# Patient Record
Sex: MALE | URBAN - METROPOLITAN AREA
[De-identification: names, ages, dates, MRNs, and addresses within clinical notes are randomized per-mention and may not be internally consistent; named-entity substitution may affect disease eponyms.]

---

## 2020-09-21 ENCOUNTER — HISTORICAL (OUTPATIENT)
Dept: ADMINISTRATIVE | Facility: HOSPITAL | Age: 45
End: 2020-09-21

## 2020-09-21 LAB
ABS NEUT (OLG): 3.68 X10(3)/MCL (ref 2.1–9.2)
ALBUMIN SERPL-MCNC: 3.8 GM/DL (ref 3.4–5)
ALBUMIN/GLOB SERPL: 0.9 RATIO (ref 1.1–2)
ALP SERPL-CCNC: 99 UNIT/L (ref 45–117)
ALT SERPL-CCNC: 65 UNIT/L (ref 12–78)
AST SERPL-CCNC: 29 UNIT/L (ref 15–37)
BASOPHILS # BLD AUTO: 0 X10(3)/MCL (ref 0–0.2)
BASOPHILS NFR BLD AUTO: 1 %
BILIRUB SERPL-MCNC: 0.6 MG/DL (ref 0.2–1)
BILIRUBIN DIRECT+TOT PNL SERPL-MCNC: 0.1 MG/DL (ref 0–0.2)
BILIRUBIN DIRECT+TOT PNL SERPL-MCNC: 0.5 MG/DL
BUN SERPL-MCNC: 14 MG/DL (ref 7–18)
CALCIUM SERPL-MCNC: 9.3 MG/DL (ref 8.5–10.1)
CHLORIDE SERPL-SCNC: 104 MMOL/L (ref 98–107)
CO2 SERPL-SCNC: 28 MMOL/L (ref 21–32)
CREAT SERPL-MCNC: 0.7 MG/DL (ref 0.6–1.3)
CRP SERPL-MCNC: <0.3 MG/DL
D DIMER PPP IA.FEU-MCNC: 0.37 MCG/ML FEU
EOSINOPHIL # BLD AUTO: 0.2 X10(3)/MCL (ref 0–0.9)
EOSINOPHIL NFR BLD AUTO: 3 %
ERYTHROCYTE [DISTWIDTH] IN BLOOD BY AUTOMATED COUNT: 12.8 % (ref 11.5–14.5)
ERYTHROCYTE [SEDIMENTATION RATE] IN BLOOD: 5 MM/HR (ref 0–15)
GLOBULIN SER-MCNC: 4.4 GM/ML (ref 2.3–3.5)
GLUCOSE SERPL-MCNC: 114 MG/DL (ref 74–100)
HCT VFR BLD AUTO: 49.1 % (ref 40–51)
HGB BLD-MCNC: 17 GM/DL (ref 13.5–17.5)
IMM GRANULOCYTES # BLD AUTO: 0.02 10*3/UL
IMM GRANULOCYTES NFR BLD AUTO: 0 %
LYMPHOCYTES # BLD AUTO: 2 X10(3)/MCL (ref 0.6–4.6)
LYMPHOCYTES NFR BLD AUTO: 29 %
MCH RBC QN AUTO: 32.1 PG (ref 26–34)
MCHC RBC AUTO-ENTMCNC: 34.6 GM/DL (ref 31–37)
MCV RBC AUTO: 92.8 FL (ref 80–100)
MONOCYTES # BLD AUTO: 0.8 X10(3)/MCL (ref 0.1–1.3)
MONOCYTES NFR BLD AUTO: 12 %
NEUTROPHILS # BLD AUTO: 3.68 X10(3)/MCL (ref 2.1–9.2)
NEUTROPHILS NFR BLD AUTO: 54 %
PLATELET # BLD AUTO: 205 X10(3)/MCL (ref 130–400)
PMV BLD AUTO: 11.1 FL (ref 7.4–10.4)
POTASSIUM SERPL-SCNC: 3.7 MMOL/L (ref 3.5–5.1)
PROT SERPL-MCNC: 8.2 GM/DL (ref 6.4–8.2)
RBC # BLD AUTO: 5.29 X10(6)/MCL (ref 4.5–5.9)
SODIUM SERPL-SCNC: 139 MMOL/L (ref 136–145)
WBC # SPEC AUTO: 6.8 X10(3)/MCL (ref 4.5–11)

## 2020-09-30 ENCOUNTER — HISTORICAL (OUTPATIENT)
Dept: RADIOLOGY | Facility: HOSPITAL | Age: 45
End: 2020-09-30

## 2021-04-19 ENCOUNTER — HISTORICAL (OUTPATIENT)
Dept: CARDIOLOGY | Facility: HOSPITAL | Age: 46
End: 2021-04-19

## 2022-04-10 ENCOUNTER — HISTORICAL (OUTPATIENT)
Dept: ADMINISTRATIVE | Facility: HOSPITAL | Age: 47
End: 2022-04-10

## 2022-04-26 VITALS
WEIGHT: 173.75 LBS | BODY MASS INDEX: 29.66 KG/M2 | SYSTOLIC BLOOD PRESSURE: 126 MMHG | HEIGHT: 64 IN | OXYGEN SATURATION: 100 % | DIASTOLIC BLOOD PRESSURE: 80 MMHG

## 2022-04-30 NOTE — PROGRESS NOTES
Patient:   Dakota Contreras             MRN: [id number]            FIN: [id number]               Age:   45 years     Sex:  Male     :  1975   Associated Diagnoses:   None   Author:   Shaik STEVO, Linda HICKS        CC:  .    HPI:  45-year-old male with no past medical history who was tested positive for COVID on 2020 and was hospitalized was placed on Ventimask was initially started on Rocephin and azithromycin completed 5 days of Decadron, remedies with 3 doses.  CT thorax resulted bilateral infiltrates and right upper lung consolidation with concerns of community-acquired pneumonia, Rocephin was stopped and was started on vancomycin, meropenem, azithromycin received for 5 days.  Cultures negative, respiratory PCR negative during hospitalization he had episodes of bradycardia with early repolarization in lead I lead to V3 V4 V5 V6 Case was discussed with cardiology who had low suspicion of acute MI, could have been likely secondary to Remedesvir was stopped on day 3.  He was discharged home with Xarelto 10 mg for 28 days, aspirin, statin.  Reported that he has completed his Xarelto.  Reports since he has been discharged he still feels short of breath on exertion, no shortness of breath on rest due to which he is unable to walk for long distance and perform his regular work for long time.  Denied any cough, fever, chills, chest pain, palpitation, dizziness.    Review of systems  Negative except for above    Physical Exam:   Vital Signs (last 24 hrs)  Vital Signs (last 24 hrs)_____  Last Charted___________  Temp Oral     36.7 DegC  (SEP 21 12:)  Heart Rate Peripheral   78 bpm  (SEP 21 12:)  Resp Rate         22 br/min  (SEP 21 12:)  SBP      111 mmHg  (SEP 21 12:)  DBP      75 mmHg  (SEP 21 12:)  Weight      76.7 kg  (SEP 21 12:)  Height      160 cm  (SEP 21 12:)  BMI      29.96  (SEP 21 12:)      General:  Alert and oriented, No acute distress.    Neck:  Supple,   Respiratory: Mild crackles on  left upper lung field, respirations are non-labored, Breath sounds are equal, Symmetrical chest wall expansion,  Cardiovascular:  Normal rate, Regular rhythm, No murmur, Good pulses equal in all extremities, Normal peripheral perfusion, No edema.    Gastrointestinal:  Soft, Non-tender, Non-distended, Normal bowel sounds, No organomegaly.    Musculoskeletal:  Normal range of motion,   Neurologic:  Alert, Oriented, Normal sensory, Normal motor function, No focal deficits, Cranial Nerves II-XII are grossly intact,          Assessment & Plan:  COVID-19 pneumonia on 8-, is here for post davis follow-up  Bradycardia-resolved    Plan  -Patient was complaining of shortness of breath.  He maintained his saturations 98% on room air, on ambulation his lowest saturation was 94%.  Ordered ESR, CRP, d-dimer, echo, chest x-ray for evaluation.  His previous chest x-ray showed diffuse pulmonary infiltrates and discussed with patient there with the degree of disease that he has it is going to take time for his symptoms to resolve.  Recommended to follow-up in 1 month with above labs and present to ER in case of emergency.  Started on DuoNeb.  -Holter monitoring showed heart rate of 70s, lowest being 50 with some PVCs.  Refer to cardiology.  -As patient does not have any PCP, will assign myself as his PCP to which patient agreed.  Refilled medications.  Follow-up in 1 month